# Patient Record
Sex: MALE | Race: WHITE | NOT HISPANIC OR LATINO | Employment: FULL TIME | URBAN - METROPOLITAN AREA
[De-identification: names, ages, dates, MRNs, and addresses within clinical notes are randomized per-mention and may not be internally consistent; named-entity substitution may affect disease eponyms.]

---
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
## 2020-03-06 ENCOUNTER — OFFICE VISIT (OUTPATIENT)
Dept: FAMILY MEDICINE CLINIC | Facility: CLINIC | Age: 20
End: 2020-03-06
Payer: COMMERCIAL

## 2020-03-06 VITALS
DIASTOLIC BLOOD PRESSURE: 70 MMHG | WEIGHT: 130 LBS | BODY MASS INDEX: 18.61 KG/M2 | SYSTOLIC BLOOD PRESSURE: 102 MMHG | TEMPERATURE: 97.5 F | HEIGHT: 70 IN

## 2020-03-06 DIAGNOSIS — B35.1 ONYCHOMYCOSIS: Primary | ICD-10-CM

## 2020-03-06 DIAGNOSIS — G43.009 MIGRAINE WITHOUT AURA AND WITHOUT STATUS MIGRAINOSUS, NOT INTRACTABLE: ICD-10-CM

## 2020-03-06 PROCEDURE — 1036F TOBACCO NON-USER: CPT | Performed by: PHYSICIAN ASSISTANT

## 2020-03-06 PROCEDURE — 3008F BODY MASS INDEX DOCD: CPT | Performed by: PHYSICIAN ASSISTANT

## 2020-03-06 PROCEDURE — 99213 OFFICE O/P EST LOW 20 MIN: CPT | Performed by: PHYSICIAN ASSISTANT

## 2020-03-06 RX ORDER — TERBINAFINE HYDROCHLORIDE 250 MG/1
TABLET ORAL
Qty: 28 TABLET | Refills: 0 | Status: SHIPPED | OUTPATIENT
Start: 2020-03-06 | End: 2020-07-07

## 2020-03-06 NOTE — PROGRESS NOTES
Assessment and Plan:  Patient Instructions     1  Onycomycosis-recommend Lamisil pulse therapy  Patient to take 1 tablet daily for 1 week, then off for 3 weeks and repeat for 4 cycles  Possible side effects of medication were discussed with patient  2   Migraine headaches -these have been improved  No medication changes at this time  Problem List Items Addressed This Visit        Cardiovascular and Mediastinum    Migraine without aura and without status migrainosus, not intractable      Other Visit Diagnoses     Onychomycosis    -  Primary    Relevant Medications    terbinafine (LamISIL) 250 mg tablet                 Diagnoses and all orders for this visit:    Onychomycosis  -     terbinafine (LamISIL) 250 mg tablet; Take 1 tablet daily by mouth for 1 week at of each month for 4 cycles  Migraine without aura and without status migrainosus, not intractable              Subjective:      Patient ID: Cleatus Severance is a 23 y o  male  CC:    Chief Complaint   Patient presents with    Nail Problem     Pt states he is here for toe nail issues, he states they are  yellow and states he is unaware of when this began  HPI:      HPI:  This is a 44-year-old gentleman that presents to the office with concerns over yellowing and thickening of the toenails  He is not exactly sure when this started but it seems to involve multiple toenails at this point  He is interested in going to the beach this summer and would like to try to clear this infection  The following portions of the patient's history were reviewed and updated as appropriate: allergies, current medications, past family history, past medical history, past social history, past surgical history and problem list       Review of Systems   Constitutional: Negative for chills, fatigue and fever  HENT: Negative for congestion, ear pain and sinus pressure  Eyes: Negative for visual disturbance     Respiratory: Negative for cough, chest tightness and shortness of breath  Cardiovascular: Negative for chest pain and palpitations  Gastrointestinal: Negative for diarrhea, nausea and vomiting  Endocrine: Negative for polyuria  Genitourinary: Negative for dysuria and frequency  Musculoskeletal: Negative for arthralgias and myalgias  Skin: Negative for pallor and rash  Yellowing and thickening of the toenails of both feet  Neurological: Negative for dizziness, weakness, light-headedness, numbness and headaches  Psychiatric/Behavioral: Negative for agitation, behavioral problems and sleep disturbance  All other systems reviewed and are negative  Data to review:       Objective:    Vitals:    03/06/20 1129   BP: 102/70   BP Location: Left arm   Patient Position: Sitting   Cuff Size: Adult   Temp: 97 5 °F (36 4 °C)   TempSrc: Tympanic   Weight: 59 kg (130 lb)   Height: 5' 10" (1 778 m)        Physical Exam   Constitutional: He is oriented to person, place, and time  He appears well-developed and well-nourished  No distress  HENT:   Head: Normocephalic and atraumatic  Right Ear: External ear normal    Left Ear: External ear normal    Nose: Nose normal    Mouth/Throat: Oropharynx is clear and moist  No oropharyngeal exudate  Eyes: Pupils are equal, round, and reactive to light  Conjunctivae and EOM are normal    Neck: Normal range of motion  Neck supple  No tracheal deviation present  No thyromegaly present  Cardiovascular: Normal rate, regular rhythm and normal heart sounds  Exam reveals no friction rub  No murmur heard  Pulmonary/Chest: Effort normal and breath sounds normal  No respiratory distress  He has no wheezes  He has no rales  Abdominal: Soft  Bowel sounds are normal  He exhibits no distension  There is no tenderness  There is no rebound and no guarding  Musculoskeletal: Normal range of motion  He exhibits no edema or tenderness  Lymphadenopathy:     He has no cervical adenopathy     Neurological: He is alert and oriented to person, place, and time  No cranial nerve deficit  Coordination normal    Skin: Skin is warm and dry  No rash noted  No erythema  Toenails of the 2nd through 5th digits of both feet are yellow and hypertrophic  Psychiatric: He has a normal mood and affect  His behavior is normal  Thought content normal    Nursing note and vitals reviewed

## 2020-03-06 NOTE — PATIENT INSTRUCTIONS
1   Onycomycosis-recommend Lamisil pulse therapy  Patient to take 1 tablet daily for 1 week, then off for 3 weeks and repeat for 4 cycles  Possible side effects of medication were discussed with patient  2   Migraine headaches -these have been improved  No medication changes at this time

## 2020-03-19 ENCOUNTER — TELEPHONE (OUTPATIENT)
Dept: FAMILY MEDICINE CLINIC | Facility: CLINIC | Age: 20
End: 2020-03-19

## 2020-03-19 NOTE — TELEPHONE ENCOUNTER
PATIENT CALLED PRESENTING WITH COUGH, ABD PAIN, FATIGUE AND HEADACHE-NEG FEVER, NEG SOB, NO OTHER SYMPTOMS, NEG TRAVEL AND DENIES ANY KNOWN EXPOSURE TO ILL OR SUSPECTED COVID PATIENTS   PER DR MARROQUIN PT WAS SCHEDULED WITH VIRTUAL VISIT WITH NELLA AKINS ON 03/20/2020

## 2020-03-20 ENCOUNTER — TELEMEDICINE (OUTPATIENT)
Dept: FAMILY MEDICINE CLINIC | Facility: CLINIC | Age: 20
End: 2020-03-20
Payer: COMMERCIAL

## 2020-03-20 DIAGNOSIS — J01.00 ACUTE NON-RECURRENT MAXILLARY SINUSITIS: Primary | ICD-10-CM

## 2020-03-20 PROCEDURE — 99213 OFFICE O/P EST LOW 20 MIN: CPT | Performed by: PHYSICIAN ASSISTANT

## 2020-03-20 RX ORDER — AZITHROMYCIN 250 MG/1
TABLET, FILM COATED ORAL
Qty: 6 TABLET | Refills: 0 | Status: SHIPPED | OUTPATIENT
Start: 2020-03-20 | End: 2020-03-25

## 2020-03-20 NOTE — PATIENT INSTRUCTIONS
1   Acute sinusitis-patient does have some slightly improving symptoms today  Advised him that this could still be viral in he could give it a day or 2 to see if symptoms improve  I will however send in a prescription antibiotic is Zithromax Z-Toño to be used if symptoms are worsening or persistent over the course of the weekend  If he is having more mucus production with his cough or thicker mucus from the nose this would be recommended  Patient verbalizes understanding and agreement with plan  In the meantime he will continue ibuprofen, fluids, and Delsym as needed for cough  A work note will be mailed to his home address for this past Wednesday and Thursday

## 2020-11-17 ENCOUNTER — TELEMEDICINE (OUTPATIENT)
Dept: FAMILY MEDICINE CLINIC | Facility: CLINIC | Age: 20
End: 2020-11-17
Payer: COMMERCIAL

## 2020-11-17 DIAGNOSIS — Z20.822 EXPOSURE TO COVID-19 VIRUS: Primary | ICD-10-CM

## 2020-11-17 PROCEDURE — 99212 OFFICE O/P EST SF 10 MIN: CPT | Performed by: FAMILY MEDICINE

## 2020-11-17 PROCEDURE — 1036F TOBACCO NON-USER: CPT | Performed by: FAMILY MEDICINE

## 2021-01-07 NOTE — PROGRESS NOTES
Assessment and Plan:  Patient Instructions   1  Headaches-these sound most likely chronic daily tension headaches  I would recommend assessing full labs including thyroid, Lyme titer, sed rate, CBC, CMP  If all labs are normal consider further imaging with MRI of the brain  Patient was also given prescription for diclofenac 75 mg to be used as necessary at the onset of headache  Vision exam was within normal, 20/20 bilaterally in the office  Diagnoses and all orders for this visit:    Nonintractable headache, unspecified chronicity pattern, unspecified headache type  -     CBC and differential  -     Comprehensive metabolic panel  -     TSH, 3rd generation with Free T4 reflex  -     Lyme Antibody Profile with reflex to WB  -     Sedimentation rate, automated  -     diclofenac (VOLTAREN) 75 mg EC tablet; Take 1 tablet at onset of headache as necessary  Subjective:      Patient ID: Camilo Jimenez is a 25 y o  male  CC:    Chief Complaint   Patient presents with    Headache     daily for a long time  Has tried Excedrin migraine with no relief  mjs       HPI:    HPI:  This is an 25year-old gentleman that presents to the office with concerns over headaches that have been becoming more frequent over the past few months  He is now having a headache almost every day  Headaches are usually bilateral at the temporal area  They often come later in the day  At times he can rate them a 9/10 on the pain scale and they can be incapacitating of what he is doing at that time  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  He has not had any major changes in his dietary intake and has not been drinking as much caffeine recently  Typically when he takes Excedrin migraine it does help a little bit with the symptoms  He has not had any nausea or vomiting with the symptoms  He has not had any photophobia or phonophobia        The following portions of the patient's history were reviewed and updated as appropriate: allergies, current medications, past family history, past medical history, past social history, past surgical history and problem list       Review of Systems   Constitutional: Negative for chills, fatigue and fever  HENT: Negative for congestion, ear pain and sinus pressure  Eyes: Negative for visual disturbance  Respiratory: Negative for cough, chest tightness and shortness of breath  Cardiovascular: Negative for chest pain and palpitations  Gastrointestinal: Negative for diarrhea, nausea and vomiting  Endocrine: Negative for polyuria  Genitourinary: Negative for dysuria and frequency  Musculoskeletal: Negative for arthralgias and myalgias  Skin: Negative for pallor and rash  Neurological: Positive for headaches  Negative for dizziness, weakness, light-headedness and numbness  Psychiatric/Behavioral: Negative for agitation, behavioral problems and sleep disturbance  All other systems reviewed and are negative  Data to review:       Objective:    Vitals:    03/21/19 1425   BP: 94/62   Pulse: 80   Weight: 59 9 kg (132 lb)   Height: 5' 9 5" (1 765 m)        Physical Exam   Constitutional: He is oriented to person, place, and time  He appears well-developed and well-nourished  No distress  HENT:   Head: Normocephalic and atraumatic  Right Ear: External ear normal    Left Ear: External ear normal    Nose: Nose normal    Mouth/Throat: Oropharynx is clear and moist  No oropharyngeal exudate  Eyes: Pupils are equal, round, and reactive to light  Conjunctivae and EOM are normal    Neck: Normal range of motion  Neck supple  No tracheal deviation present  No thyromegaly present  Cardiovascular: Normal rate, regular rhythm and normal heart sounds  Exam reveals no friction rub  No murmur heard  Pulmonary/Chest: Effort normal and breath sounds normal  No respiratory distress  He has no wheezes  He has no rales     Abdominal: Soft  Bowel sounds are normal  He exhibits no distension  There is no tenderness  There is no rebound and no guarding  Musculoskeletal: Normal range of motion  He exhibits no edema or tenderness  Lymphadenopathy:     He has no cervical adenopathy  Neurological: He is alert and oriented to person, place, and time  No cranial nerve deficit  Coordination normal    Skin: Skin is warm and dry  No rash noted  No erythema  Psychiatric: He has a normal mood and affect  His behavior is normal  Thought content normal    Nursing note and vitals reviewed  negative detailed exam

## 2021-01-11 ENCOUNTER — TELEMEDICINE (OUTPATIENT)
Dept: FAMILY MEDICINE CLINIC | Facility: CLINIC | Age: 21
End: 2021-01-11
Payer: COMMERCIAL

## 2021-01-11 VITALS — WEIGHT: 140 LBS | TEMPERATURE: 98.1 F | BODY MASS INDEX: 20.09 KG/M2

## 2021-01-11 DIAGNOSIS — G43.009 MIGRAINE WITHOUT AURA AND WITHOUT STATUS MIGRAINOSUS, NOT INTRACTABLE: Primary | ICD-10-CM

## 2021-01-11 PROCEDURE — 1036F TOBACCO NON-USER: CPT | Performed by: NURSE PRACTITIONER

## 2021-01-11 PROCEDURE — 99213 OFFICE O/P EST LOW 20 MIN: CPT | Performed by: NURSE PRACTITIONER

## 2021-01-11 RX ORDER — PREDNISONE 20 MG/1
40 TABLET ORAL DAILY
Qty: 10 TABLET | Refills: 0 | Status: SHIPPED | OUTPATIENT
Start: 2021-01-11 | End: 2021-01-16

## 2021-01-11 RX ORDER — TOPIRAMATE 25 MG/1
25 TABLET ORAL 2 TIMES DAILY
Qty: 60 TABLET | Refills: 0 | Status: SHIPPED | OUTPATIENT
Start: 2021-01-11 | End: 2021-06-03

## 2021-01-11 NOTE — PATIENT INSTRUCTIONS
Problem List Items Addressed This Visit        Cardiovascular and Mediastinum    Migraine without aura and without status migrainosus, not intractable - Primary     With no neurological involvement I have low suspicion that this is an acute event such as a brain bleed  Will begin Topamax b i d  As a preventative measure for migraines  Will also begin a 5 day course of prednisone to help with inflammation and pain  Patient was told to follow-up in 1 week if symptoms do not improve  Relevant Medications    topiramate (TOPAMAX) 25 mg tablet    predniSONE 20 mg tablet        COVID-19 Home Care Guidelines    Your healthcare provider and/or public health staff have evaluated you and have determined that you do not need to remain in the hospital at this time  At this time you can be isolated at home where you will be monitored by staff from your local or state health department  You should carefully follow the prevention and isolation steps below until a healthcare provider or local or state health department says that you can return to your normal activities  Stay home except to get medical care    People who are mildly ill with COVID-19 are able to isolate at home during their illness  You should restrict activities outside your home, except for getting medical care  Do not go to work, school, or public areas  Avoid using public transportation, ride-sharing, or taxis  Separate yourself from other people and animals in your home    People: As much as possible, you should stay in a specific room and away from other people in your home  Also, you should use a separate bathroom, if available  Animals: You should restrict contact with pets and other animals while you are sick with COVID-19, just like you would around other people   Although there have not been reports of pets or other animals becoming sick with COVID-19, it is still recommended that people sick with COVID-19 limit contact with animals until more information is known about the virus  When possible, have another member of your household care for your animals while you are sick  If you are sick with COVID-19, avoid contact with your pet, including petting, snuggling, being kissed or licked, and sharing food  If you must care for your pet or be around animals while you are sick, wash your hands before and after you interact with pets and wear a facemask  See COVID-19 and Animals for more information  Call ahead before visiting your doctor    If you have a medical appointment, call the healthcare provider and tell them that you have or may have COVID-19  This will help the healthcare providers office take steps to keep other people from getting infected or exposed  Wear a facemask    You should wear a facemask when you are around other people (e g , sharing a room or vehicle) or pets and before you enter a healthcare providers office  If you are not able to wear a facemask (for example, because it causes trouble breathing), then people who live with you should not stay in the same room with you, or they should wear a facemask if they enter your room  Cover your coughs and sneezes    Cover your mouth and nose with a tissue when you cough or sneeze  Throw used tissues in a lined trash can  Immediately wash your hands with soap and water for at least 20 seconds or, if soap and water are not available, clean your hands with an alcohol-based hand  that contains at least 60% alcohol  Clean your hands often    Wash your hands often with soap and water for at least 20 seconds, especially after blowing your nose, coughing, or sneezing; going to the bathroom; and before eating or preparing food  If soap and water are not readily available, use an alcohol-based hand  with at least 60% alcohol, covering all surfaces of your hands and rubbing them together until they feel dry    Soap and water are the best option if hands are visibly dirty  Avoid touching your eyes, nose, and mouth with unwashed hands  Avoid sharing personal household items    You should not share dishes, drinking glasses, cups, eating utensils, towels, or bedding with other people or pets in your home  After using these items, they should be washed thoroughly with soap and water  Clean all high-touch surfaces everyday    High touch surfaces include counters, tabletops, doorknobs, bathroom fixtures, toilets, phones, keyboards, tablets, and bedside tables  Also, clean any surfaces that may have blood, stool, or body fluids on them  Use a household cleaning spray or wipe, according to the label instructions  Labels contain instructions for safe and effective use of the cleaning product including precautions you should take when applying the product, such as wearing gloves and making sure you have good ventilation during use of the product  Monitor your symptoms    Seek prompt medical attention if your illness is worsening (e g , difficulty breathing)  Before seeking care, call your healthcare provider and tell them that you have, or are being evaluated for, COVID-19  Put on a facemask before you enter the facility  These steps will help the healthcare providers office to keep other people in the office or waiting room from getting infected or exposed  Ask your healthcare provider to call the local or state health department  Persons who are placed under active monitoring or facilitated self-monitoring should follow instructions provided by their local health department or occupational health professionals, as appropriate  If you have a medical emergency and need to call 911, notify the dispatch personnel that you have, or are being evaluated for COVID-19  If possible, put on a facemask before emergency medical services arrive      Discontinuing home isolation    Patients with confirmed COVID-19 should remain under home isolation precautions until the following conditions are met:   - They have had no fever for at least 24 hours (that is one full day of no fever without the use medicine that reduces fevers)  AND  - other symptoms have improved (for example, when their cough or shortness of breath have improved)  AND  - If had mild or moderate illness, at least 10 days have passed since their symptoms first appeared or if severe illness (needed oxygen) or immunosuppressed, at least 20 days have passed since symptoms first appeared  Patients with confirmed COVID-19 should also notify close contacts (including their workplace) and ask that they self-quarantine  Currently, close contact is defined as being within 6 feet for 15 minutes or more from the period 24 hours starting 48 hours before symptom onset to the time at which the patient went into isolation  Close contacts of patients diagnosed with COVID-19 should be instructed by the patient to self-quarantine for 14 days from the last time of their last contact with the patient       Source: [employer]

## 2021-01-11 NOTE — PROGRESS NOTES
Virtual Regular Visit      Assessment/Plan:    Problem List Items Addressed This Visit        Cardiovascular and Mediastinum    Migraine without aura and without status migrainosus, not intractable - Primary     With no neurological involvement I have low suspicion that this is an acute event such as a brain bleed  Will begin Topamax b i d  As a preventative measure for migraines  Will also begin a 5 day course of prednisone to help with inflammation and pain  Patient was told to follow-up in 1 week if symptoms do not improve  Relevant Medications    topiramate (TOPAMAX) 25 mg tablet    predniSONE 20 mg tablet               Reason for visit is   Chief Complaint   Patient presents with    Headache     Pt c/o ongoing headaches  Pt has a known migraines but states this is different  He states he has pain in the back of his head and stiffness in neck  He has been using Ibuprofen with no relief  kw    Virtual Regular Visit        Encounter provider ISRA Serna    Provider located at [address] [place] [address] PRIMARY CARE  [facility] [id number]      Recent Visits  No visits were found meeting these conditions  Showing recent visits within past 7 days and meeting all other requirements     Today's Visits  Date Type Provider Dept   01/11/21 Telemedicine [facility] Primary Care   Showing today's visits and meeting all other requirements     Future Appointments  No visits were found meeting these conditions  Showing future appointments within next 150 days and meeting all other requirements        The patient was identified by name and date of birth  Casi De Souza was informed that this is a telemedicine visit and that the visit is being conducted through [employer] and patient was informed that this is not a secure, HIPAA-compliant platform  He agrees to proceed     My office door was closed  No one else was in the room    He acknowledged consent and understanding of privacy and security of the video platform  The patient has agreed to participate and understands they can discontinue the visit at any time  Patient is aware this is a billable service  Subjective  Luis Angel Smith is a 21 y o  male    Patient has bee having ongoing headaches  He states he has pain in the back of his head and stiffness in his neck  He has been having the headaches for about a week every other day  He reports that the pain is dull but it is there constantly  He does have a history of migraines in the past  He has been taking Ibuprofen with no relief  He states that 2 days ago he began to feel very nauseated but it passed after about an hour  He states he currently has a headache today  He states he can feel his pulse on the back of his head but denies that his head feels like it is throbbing  Patient was unable to check his BP during exam  Denies any neurological symptoms such as weakness, changes in his vision or hearing, loss of sensation, lightheadedness or dizziness  Past Medical History:   Diagnosis Date    Allergic rhinitis     Resolved 2/27/2017     Displaced fracture of proximal phalanx of unspecified thumb, initial encounter for closed fracture     Resolved 2/27/2017     Head injury     Resolved 2/27/2017     Migraines        Past Surgical History:   Procedure Laterality Date    NO PAST SURGERIES         Current Outpatient Medications   Medication Sig Dispense Refill    predniSONE 20 mg tablet Take 2 tablets (40 mg total) by mouth daily for 5 days 10 tablet 0    topiramate (TOPAMAX) 25 mg tablet Take 1 tablet (25 mg total) by mouth 2 (two) times a day 60 tablet 0     No current facility-administered medications for this visit           Allergies   Allergen Reactions    Cefprozil Hives     Got rash after taking for several days      Penicillins Hives     Got bad hives right away         Review of Systems   Constitutional: Negative for chills, fatigue and fever  HENT: Negative for ear pain, sinus pressure, sinus pain and sore throat  Eyes: Negative for pain and visual disturbance  Respiratory: Negative for cough and shortness of breath  Cardiovascular: Negative for chest pain and palpitations  Gastrointestinal: Negative for abdominal pain and vomiting  Endocrine: Negative for polydipsia, polyphagia and polyuria  Genitourinary: Negative for dysuria and hematuria  Musculoskeletal: Negative for arthralgias and back pain  Skin: Negative for color change and rash  Neurological: Positive for headaches (occipital-across the back of his head )  Negative for dizziness, tremors, seizures, syncope, weakness and light-headedness  Psychiatric/Behavioral: Negative for confusion, decreased concentration and hallucinations  The patient is not nervous/anxious and is not hyperactive  All other systems reviewed and are negative  Video Exam    Vitals:    01/11/21 1525   Temp: 98 1 °F (36 7 °C)   TempSrc: Temporal   Weight: 63 5 kg (140 lb)       Physical Exam  Vitals reviewed: limited due to [employer] exam    Constitutional:       General: He is not in acute distress  Appearance: Normal appearance  He is not ill-appearing  Neurological:      Mental Status: He is alert  I spent 15 minutes directly with the patient during this visit      [address] acknowledges that he has consented to an online visit or consultation  He understands that the online visit is based solely on information provided by him, and that, in the absence of a face-to-face physical evaluation by the physician, the diagnosis he receives is both limited and provisional in terms of accuracy and completeness  This is not intended to replace a full medical face-to-face evaluation by the physician  Mat Mercedes understands and accepts these terms

## 2021-01-11 NOTE — ASSESSMENT & PLAN NOTE
With no neurological involvement I have low suspicion that this is an acute event such as a brain bleed  Will begin Topamax b i d  As a preventative measure for migraines  Will also begin a 5 day course of prednisone to help with inflammation and pain  Patient was told to follow-up in 1 week if symptoms do not improve

## 2021-03-12 ENCOUNTER — TELEMEDICINE (OUTPATIENT)
Dept: FAMILY MEDICINE CLINIC | Facility: CLINIC | Age: 21
End: 2021-03-12
Payer: COMMERCIAL

## 2021-03-12 VITALS — TEMPERATURE: 97.8 F | WEIGHT: 140 LBS | HEIGHT: 71 IN | BODY MASS INDEX: 19.6 KG/M2

## 2021-03-12 DIAGNOSIS — H92.09 OTALGIA, UNSPECIFIED LATERALITY: ICD-10-CM

## 2021-03-12 DIAGNOSIS — R51.9 ACUTE NONINTRACTABLE HEADACHE, UNSPECIFIED HEADACHE TYPE: Primary | ICD-10-CM

## 2021-03-12 DIAGNOSIS — G43.009 MIGRAINE WITHOUT AURA AND WITHOUT STATUS MIGRAINOSUS, NOT INTRACTABLE: ICD-10-CM

## 2021-03-12 PROCEDURE — 3725F SCREEN DEPRESSION PERFORMED: CPT | Performed by: FAMILY MEDICINE

## 2021-03-12 PROCEDURE — 3008F BODY MASS INDEX DOCD: CPT | Performed by: FAMILY MEDICINE

## 2021-03-12 PROCEDURE — 1036F TOBACCO NON-USER: CPT | Performed by: FAMILY MEDICINE

## 2021-03-12 PROCEDURE — 99214 OFFICE O/P EST MOD 30 MIN: CPT | Performed by: FAMILY MEDICINE

## 2021-03-12 RX ORDER — METHYLPREDNISOLONE 4 MG/1
TABLET ORAL
Qty: 1 EACH | Refills: 0 | Status: SHIPPED | OUTPATIENT
Start: 2021-03-12 | End: 2021-03-18

## 2021-03-12 NOTE — PROGRESS NOTES
COVID-19 Virtual Visit     Assessment/Plan:    1  Headache, mild intermittent since Monday  Not worst of life  2  Migraine headache by history  3  Mild ear pain   Patient will be sent for COVID testing when quarantine at home to result is known  Medrol Dosepak is ordered  Return in 1 week if still symptoms  If symptoms worsen over we can report to [facility] Emergency Department      Problem List Items Addressed This Visit        Cardiovascular and Mediastinum    Migraine without aura and without status migrainosus, not intractable      Medrol prescribed         Relevant Medications    methylPREDNISolone 4 MG tablet therapy pack      Other Visit Diagnoses     Acute nonintractable headache, unspecified headache type    -  Primary    Relevant Medications    methylPREDNISolone 4 MG tablet therapy pack    Other Relevant Orders    Novel Coronavirus (Covid-19),PCR [facility] - Collected at [facility]    Otalgia, unspecified laterality        Relevant Medications    methylPREDNISolone 4 MG tablet therapy pack    Other Relevant Orders    Novel Coronavirus (Covid-19),PCR [facility] - Collected at [facility]         Disposition:     I referred patient to one of our centralized sites for a COVID-19 swab  Patient was sent for COVID testing  Medrol Dosepak was prescribed    I have spent 17 minutes directly with the patient  Encounter provider Nay Soriano DO    Provider located at [address] [place] [address] PRIMARY CARE  [facility] [id number]    Recent Visits  No visits were found meeting these conditions  Showing recent visits within past 7 days and meeting all other requirements     Today's Visits  Date Type Provider Dept   03/12/21 Telemedicine Nay Soriano DO [facility]   Showing today's visits and meeting all other requirements     Future Appointments  No visits were found meeting these conditions     Showing future appointments within next 150 days and meeting all other requirements      This virtual check-in was done via [employer] and patient was informed that this is not a secure, HIPAA-compliant platform  He agrees to proceed  Patient agrees to participate in a virtual check in via telephone or video visit instead of presenting to the office to address urgent/immediate medical needs  Patient is aware this is a billable service  After connecting through [facility], the patient was identified by name and date of birth  Polo Moore was informed that this was a telemedicine visit and that the exam was being conducted confidentially over secure lines  My office door was closed  No one else was in the room  Polo Moore acknowledged consent and understanding of privacy and security of the telemedicine visit  I informed the patient that I have reviewed his record in Epic and presented the opportunity for him to ask any questions regarding the visit today  The patient agreed to participate  Subjective:   Polo Moore is a 21 y o  male who is concerned about COVID-19  Patient's symptoms include headache  Patient denies fever, chills, fatigue, malaise, congestion, rhinorrhea, sore throat, anosmia, loss of taste, cough, shortness of breath, chest tightness, abdominal pain, nausea, vomiting, diarrhea and myalgias       Date of symptom onset: 3/8/2021    Exposure:   Contact with a person who is under investigation (PUI) for or who is positive for COVID-19 within the last 14 days?: No    Hospitalized recently for fever and/or lower respiratory symptoms?: No      Currently a healthcare worker that is involved in direct patient care?: No      Works in a special setting where the risk of COVID-19 transmission may be high? (this may include long-term care, correctional and group home facilities; homeless shelters; assisted-living facilities and group homes ): No      Resident in a special setting where the risk of COVID-19 transmission may be high? (this may include long-term care, correctional and snf facilities; homeless shelters; assisted-living facilities and group homes ): No       On Monday patient started with headache mainly right final off and on this week  Not the most severe  Patient does have a history of migraine  Patient on Wednesday and some mild ear pain , none today  Patient has not taken medication for this    Lab Results   Component Value Date    SARSCORONAVI NOT DETECTED 11/16/2020     Past Medical History:   Diagnosis Date    Allergic rhinitis     Resolved 2/27/2017     Displaced fracture of proximal phalanx of unspecified thumb, initial encounter for closed fracture     Resolved 2/27/2017     Head injury     Resolved 2/27/2017     Migraines      Past Surgical History:   Procedure Laterality Date    NO PAST SURGERIES       Current Outpatient Medications   Medication Sig Dispense Refill    methylPREDNISolone 4 MG tablet therapy pack Use as directed on package 1 each 0    topiramate (TOPAMAX) 25 mg tablet Take 1 tablet (25 mg total) by mouth 2 (two) times a day (Patient not taking: Reported on 3/12/2021) 60 tablet 0     No current facility-administered medications for this visit  Allergies   Allergen Reactions    Cefprozil Hives     Got rash after taking for several days      Penicillins Hives     Got bad hives right away         Review of Systems   Constitutional: Negative for chills, fatigue and fever  HENT: Negative for congestion, rhinorrhea and sore throat  Respiratory: Negative for cough, chest tightness and shortness of breath  Gastrointestinal: Negative for abdominal pain, diarrhea, nausea and vomiting  Musculoskeletal: Negative for myalgias  Neurological: Positive for headaches  Objective:    Vitals:    03/12/21 1016   Temp: 97 8 °F (36 6 °C)   Weight: 63 5 kg (140 lb)   Height: 5' 11" (1 803 m)       Physical Exam  Vitals signs and nursing note reviewed  Constitutional:       General: He is not in acute distress  Appearance: Normal appearance  He is normal weight  He is not ill-appearing, toxic-appearing or diaphoretic  HENT:      Head: Normocephalic and atraumatic  Mouth/Throat:      Mouth: Mucous membranes are moist    Eyes:      General: No scleral icterus  Right eye: No discharge  Left eye: No discharge  Extraocular Movements: Extraocular movements intact  Conjunctiva/sclera: Conjunctivae normal    Neck:      Musculoskeletal: No neck rigidity  Pulmonary:      Effort: Pulmonary effort is normal    Skin:     General: Skin is dry  Neurological:      Mental Status: He is alert and oriented to person, place, and time  Cranial Nerves: No cranial nerve deficit  Psychiatric:         Mood and Affect: Mood normal          Behavior: Behavior normal          Thought Content: Thought content normal          Judgment: Judgment normal        VIRTUAL VISIT DISCLAIMER    Alexusroverto Winter acknowledges that he has consented to an online visit or consultation  He understands that the online visit is based solely on information provided by him, and that, in the absence of a face-to-face physical evaluation by the physician, the diagnosis he receives is both limited and provisional in terms of accuracy and completeness  This is not intended to replace a full medical face-to-face evaluation by the physician  Aneesh Angeles understands and accepts these terms

## 2021-06-03 ENCOUNTER — OFFICE VISIT (OUTPATIENT)
Dept: FAMILY MEDICINE CLINIC | Facility: CLINIC | Age: 21
End: 2021-06-03
Payer: COMMERCIAL

## 2021-06-03 VITALS
HEART RATE: 80 BPM | WEIGHT: 135 LBS | SYSTOLIC BLOOD PRESSURE: 106 MMHG | DIASTOLIC BLOOD PRESSURE: 78 MMHG | HEIGHT: 71 IN | TEMPERATURE: 98.2 F | BODY MASS INDEX: 18.9 KG/M2

## 2021-06-03 DIAGNOSIS — G43.009 MIGRAINE WITHOUT AURA AND WITHOUT STATUS MIGRAINOSUS, NOT INTRACTABLE: ICD-10-CM

## 2021-06-03 DIAGNOSIS — H43.392 VITREOUS FLOATERS OF LEFT EYE: ICD-10-CM

## 2021-06-03 DIAGNOSIS — H92.09 OTALGIA, UNSPECIFIED LATERALITY: Primary | ICD-10-CM

## 2021-06-03 PROCEDURE — 1036F TOBACCO NON-USER: CPT | Performed by: PHYSICIAN ASSISTANT

## 2021-06-03 PROCEDURE — 3008F BODY MASS INDEX DOCD: CPT | Performed by: PHYSICIAN ASSISTANT

## 2021-06-03 PROCEDURE — 99214 OFFICE O/P EST MOD 30 MIN: CPT | Performed by: PHYSICIAN ASSISTANT

## 2021-06-03 RX ORDER — SULFAMETHOXAZOLE AND TRIMETHOPRIM 800; 160 MG/1; MG/1
1 TABLET ORAL EVERY 12 HOURS SCHEDULED
Qty: 20 TABLET | Refills: 0 | Status: SHIPPED | OUTPATIENT
Start: 2021-06-03 | End: 2021-06-13

## 2021-06-03 NOTE — PATIENT INSTRUCTIONS
Assessment/plan:  1  Otalgia-left ear-appears tympanic membrane is clear but somewhat retracted  Would recommend treating for infection as well as eustachian tube dysfunction  He will be started on Bactrim DS, 1 tablet twice daily for 10 days as well as Flonase nasal spray, 2 sprays in each nostril twice daily  Recommend follow-up in the next week if symptoms are not improving  2  Floaters of the left eye-recommend evaluation by Ophthalmology  Order placed  3  Migraine headache-presently stable, no medication changes

## 2021-06-03 NOTE — PROGRESS NOTES
Assessment and Plan:  Patient Instructions   Assessment/plan:  1  Otalgia-left ear-appears tympanic membrane is clear but somewhat retracted  Would recommend treating for infection as well as eustachian tube dysfunction  He will be started on Bactrim DS, 1 tablet twice daily for 10 days as well as Flonase nasal spray, 2 sprays in each nostril twice daily  Recommend follow-up in the next week if symptoms are not improving  2  Floaters of the left eye-recommend evaluation by Ophthalmology  Order placed  3  Migraine headache-presently stable, no medication changes  Problem List Items Addressed This Visit        Cardiovascular and Mediastinum    Migraine without aura and without status migrainosus, not intractable      Other Visit Diagnoses     Otalgia, unspecified laterality    -  Primary    Relevant Medications    sulfamethoxazole-trimethoprim (BACTRIM DS) 800-160 mg per tablet    Vitreous floaters of left eye        Relevant Orders    Ambulatory referral to Ophthalmology                 Diagnoses and all orders for this visit:    Otalgia, unspecified laterality  -     sulfamethoxazole-trimethoprim (BACTRIM DS) 800-160 mg per tablet; Take 1 tablet by mouth every 12 (twelve) hours for 10 days    Vitreous floaters of left eye  -     Ambulatory referral to Ophthalmology; Future    Migraine without aura and without status migrainosus, not intractable              Subjective:      Patient ID: Jason Dias is a 24 y o  male  CC:    Chief Complaint   Patient presents with    Earache     c/o B/L earache  Left worse than right  Taking Tylenol  Onset 2 weeks  mjs       HPI:    HPI:  This is a 80-year-old gentleman that presents to the office with concerns over ear pain which has been going on for about 2 weeks  He does notice the symptoms are greater in the left side rather than the right  Patient states that he has had some crackling and popping in the ear    He has not noticed any difficulty hearing from the ear however  He does not feel that symptoms have gotten significantly better in the past 2 weeks  He also has a history of migraine headaches which have been stable  He does notice that he has had some floaters in his left eye for several months now  The seem to come and go more randomly  He does not seen eye doctor on a regular basis  The following portions of the patient's history were reviewed and updated as appropriate: allergies, current medications, past family history, past medical history, past social history, past surgical history and problem list       Review of Systems   Constitutional: Negative for chills, fatigue and fever  HENT: Positive for ear pain  Negative for congestion and sinus pressure  Eyes: Positive for visual disturbance  Floaters of the left eye   Respiratory: Negative for cough, chest tightness and shortness of breath  Cardiovascular: Negative for chest pain and palpitations  Gastrointestinal: Negative for diarrhea, nausea and vomiting  Endocrine: Negative for polyuria  Genitourinary: Negative for dysuria and frequency  Musculoskeletal: Negative for arthralgias and myalgias  Skin: Negative for pallor and rash  Neurological: Negative for dizziness, weakness, light-headedness, numbness and headaches  Psychiatric/Behavioral: Negative for agitation, behavioral problems and sleep disturbance  All other systems reviewed and are negative  Data to review:       Objective:    Vitals:    06/03/21 1520   BP: 106/78   Pulse: 80   Temp: 98 2 °F (36 8 °C)   Weight: 61 2 kg (135 lb)   Height: 5' 11" (1 803 m)        Physical Exam  Constitutional:       General: He is not in acute distress  Appearance: He is well-developed  HENT:      Head: Normocephalic and atraumatic  Right Ear: Tympanic membrane normal       Ears:      Comments: Left tympanic membrane is dull, retracted  There is no erythema or purulence however    Eyes: Conjunctiva/sclera: Conjunctivae normal       Pupils: Pupils are equal, round, and reactive to light  Comments: Red reflex bilaterally  Neck:      Musculoskeletal: Normal range of motion  Cardiovascular:      Rate and Rhythm: Normal rate and regular rhythm  Pulmonary:      Effort: Pulmonary effort is normal    Abdominal:      General: Abdomen is flat  Bowel sounds are normal  There is no distension  Palpations: Abdomen is soft  There is no mass  Musculoskeletal: Normal range of motion  Skin:     General: Skin is warm  Findings: No rash  Neurological:      Mental Status: He is alert and oriented to person, place, and time     Psychiatric:         Mood and Affect: Mood normal

## 2021-06-08 ENCOUNTER — TELEPHONE (OUTPATIENT)
Dept: FAMILY MEDICINE CLINIC | Facility: CLINIC | Age: 21
End: 2021-06-08

## 2022-01-03 ENCOUNTER — OFFICE VISIT (OUTPATIENT)
Dept: URGENT CARE | Facility: CLINIC | Age: 22
End: 2022-01-03
Payer: COMMERCIAL

## 2022-01-03 VITALS
TEMPERATURE: 98.2 F | SYSTOLIC BLOOD PRESSURE: 127 MMHG | BODY MASS INDEX: 18.9 KG/M2 | WEIGHT: 135 LBS | OXYGEN SATURATION: 98 % | HEART RATE: 80 BPM | DIASTOLIC BLOOD PRESSURE: 64 MMHG | HEIGHT: 71 IN | RESPIRATION RATE: 18 BRPM

## 2022-01-03 DIAGNOSIS — H93.13 TINNITUS OF BOTH EARS: Primary | ICD-10-CM

## 2022-01-03 PROCEDURE — 99213 OFFICE O/P EST LOW 20 MIN: CPT | Performed by: EMERGENCY MEDICINE

## 2022-01-03 RX ORDER — PREDNISONE 10 MG/1
TABLET ORAL
Qty: 27 TABLET | Refills: 0 | Status: SHIPPED | OUTPATIENT
Start: 2022-01-03

## 2022-01-03 NOTE — PATIENT INSTRUCTIONS
Tinnitus   WHAT YOU NEED TO KNOW:   Tinnitus is when you hear ringing, clicking, buzzing, or hissing in one or both ears  You may also hear whistling, chirping, or pulsing  It may be soft or loud, and at a low or high pitch  Tinnitus that lasts for longer than 6 months is considered chronic  DISCHARGE INSTRUCTIONS:   Call 911 if:   · You feel like hurting yourself or others because of the constant noise  Contact your healthcare provider if:   · You have headaches  · You are tired and have trouble concentrating or remembering things  · You have more anxiety or stress than usual     · You have deep sadness or depression  · You have trouble falling asleep or staying asleep  · Your symptoms do not go away or they get worse  · You have questions or concerns about your condition or care  Manage tinnitus:   · Counseling  can help you learn ways to relax, decrease stress, and make your tinnitus less noticeable  · Cognitive behavioral therapy  helps you understand your condition  Your therapist will help you learn to cope with tinnitus  You may also learn new ways to relax and retrain your behavior to decrease your symptoms  · Sound therapy, such as white noise machines, may help cover your tinnitus with a pleasant sound  Sound therapy devices can help you fall asleep or help you relax  These devices can be worn in your ear or placed next to your bed at night  · Hearing aids or cochlear implants  may help if you have hearing loss  · Do not smoke  Nicotine decreases blood flow to your ear and can make your tinnitus worse  Do not use e-cigarettes or smokeless tobacco in place of cigarettes or to help you quit  They still contain nicotine  Ask your healthcare provider for information if you currently smoke and need help quitting  · Decrease how much alcohol and caffeine you drink  Alcohol and caffeine can make your tinnitus worse      Prevent tinnitus:   · Avoid exposure to loud noise, such as loud music or power tools  Occasional exposure can still cause tinnitus  Move away from the noise or turn down the volume  · Wear ear protection  when you are exposed to loud noises  Good ear protection includes ear plugs or headphones that reduce noise  Follow up with your healthcare provider in 1 to 2 months:  Your healthcare provider may refer you to an otolaryngologist, audiologist, or neurologist  Larry Lozada may need to return for regular follow-up visits  Write your questions down so you remember to ask them during your visits  © Copyright [employer] 2021 Information is for End User's use only and may not be sold, redistributed or otherwise used for commercial purposes  All illustrations and images included in CareNotes® are the copyrighted property of [employer]  or Ileana Mendoza  The above information is an  only  It is not intended as medical advice for individual conditions or treatments  Talk to your doctor, nurse or pharmacist before following any medical regimen to see if it is safe and effective for you

## 2022-01-03 NOTE — PROGRESS NOTES
[facility] [facility]        NAME: Sandra Fraire is a 24 y o  male  : 2000    MRN: [id number]  DATE: January 3, 2022  TIME: 6:46 PM    Assessment and Plan   Tinnitus of both ears [H93 13]  1  Tinnitus of both ears  Ambulatory Referral to Otolaryngology    predniSONE 10 mg tablet         Patient Instructions     Patient Instructions   Tinnitus   WHAT YOU NEED TO KNOW:   Tinnitus is when you hear ringing, clicking, buzzing, or hissing in one or both ears  You may also hear whistling, chirping, or pulsing  It may be soft or loud, and at a low or high pitch  Tinnitus that lasts for longer than 6 months is considered chronic  DISCHARGE INSTRUCTIONS:   Call 911 if:   · You feel like hurting yourself or others because of the constant noise  Contact your healthcare provider if:   · You have headaches  · You are tired and have trouble concentrating or remembering things  · You have more anxiety or stress than usual     · You have deep sadness or depression  · You have trouble falling asleep or staying asleep  · Your symptoms do not go away or they get worse  · You have questions or concerns about your condition or care  Manage tinnitus:   · Counseling  can help you learn ways to relax, decrease stress, and make your tinnitus less noticeable  · Cognitive behavioral therapy  helps you understand your condition  Your therapist will help you learn to cope with tinnitus  You may also learn new ways to relax and retrain your behavior to decrease your symptoms  · Sound therapy, such as white noise machines, may help cover your tinnitus with a pleasant sound  Sound therapy devices can help you fall asleep or help you relax  These devices can be worn in your ear or placed next to your bed at night  · Hearing aids or cochlear implants  may help if you have hearing loss  · Do not smoke  Nicotine decreases blood flow to your ear and can make your tinnitus worse   Do not use e-cigarettes or smokeless tobacco in place of cigarettes or to help you quit  They still contain nicotine  Ask your healthcare provider for information if you currently smoke and need help quitting  · Decrease how much alcohol and caffeine you drink  Alcohol and caffeine can make your tinnitus worse  Prevent tinnitus:   · Avoid exposure to loud noise, such as loud music or power tools  Occasional exposure can still cause tinnitus  Move away from the noise or turn down the volume  · Wear ear protection  when you are exposed to loud noises  Good ear protection includes ear plugs or headphones that reduce noise  Follow up with your healthcare provider in 1 to 2 months:  Your healthcare provider may refer you to an otolaryngologist, audiologist, or neurologist  Nikole Alcantara may need to return for regular follow-up visits  Write your questions down so you remember to ask them during your visits  © Copyright [employer] 2021 Information is for End User's use only and may not be sold, redistributed or otherwise used for commercial purposes  All illustrations and images included in CareNotes® are the copyrighted property of A D A M , Inc  or [facility]   The above information is an  only  It is not intended as medical advice for individual conditions or treatments  Talk to your doctor, nurse or pharmacist before following any medical regimen to see if it is safe and effective for you  Follow up with PCP in 3-5 days  Proceed to  ER if symptoms worsen  Chief Complaint     Chief Complaint   Patient presents with    Tinnitus     Started 3 weeks ago, ringing in the ears with sinus pressure and headaches that come and go          History of Present Illness       Patient complains of ringing in the ears for the past 3 weeks  Onset of symptoms came with sinus congestion which has since resolved  He denies any vertigo        Review of Systems   Review of Systems   Constitutional: Negative for chills and fever    HENT: Positive for congestion and tinnitus  Negative for ear discharge, hearing loss, rhinorrhea and sinus pressure  Respiratory: Negative for cough, chest tightness, shortness of breath and wheezing  Gastrointestinal: Negative for diarrhea and vomiting  Musculoskeletal: Negative for neck stiffness  Skin: Negative for pallor  Neurological: Negative for headaches  Current Medications       Current Outpatient Medications:     predniSONE 10 mg tablet, Take once daily all days pills on this schedule 6- 6- 5- 4- 3- 2- 1, Disp: 27 tablet, Rfl: 0    Current Allergies     Allergies as of 01/03/2022 - Reviewed 01/03/2022   Allergen Reaction Noted    Cefprozil Hives 03/19/2013    Penicillins Hives 11/09/2012            The following portions of the patient's history were reviewed and updated as appropriate: allergies, current medications, past family history, past medical history, past social history, past surgical history and problem list      Past Medical History:   Diagnosis Date    Allergic rhinitis     Resolved 2/27/2017     Displaced fracture of proximal phalanx of unspecified thumb, initial encounter for closed fracture     Resolved 2/27/2017     Head injury     Resolved 2/27/2017     Migraines        Past Surgical History:   Procedure Laterality Date    NO PAST SURGERIES         History reviewed  No pertinent family history  Medications have been verified  Objective   /64   Pulse 80   Temp 98 2 °F (36 8 °C)   Resp 18   Ht 5' 11" (1 803 m)   Wt 61 2 kg (135 lb)   SpO2 98%   BMI 18 83 kg/m²        Physical Exam     Physical Exam  Vitals and nursing note reviewed  Constitutional:       General: He is not in acute distress  Appearance: He is well-developed  He is not diaphoretic  HENT:      Head: Normocephalic and atraumatic        Right Ear: Tympanic membrane, ear canal and external ear normal       Left Ear: Tympanic membrane, ear canal and external ear normal       Nose: Mucosal edema and congestion present  No rhinorrhea  Mouth/Throat:      Pharynx: No posterior oropharyngeal erythema  Cardiovascular:      Rate and Rhythm: Normal rate and regular rhythm  Heart sounds: Normal heart sounds  Musculoskeletal:         General: Normal range of motion  Cervical back: Neck supple  Lymphadenopathy:      Cervical: No cervical adenopathy  Skin:     General: Skin is warm and dry  Coloration: Skin is not pale  Neurological:      Mental Status: He is alert and oriented to person, place, and time  Psychiatric:         Mood and Affect: Mood normal          Behavior: Behavior normal          Thought Content:  Thought content normal          Judgment: Judgment normal

## 2022-03-18 NOTE — PATIENT INSTRUCTIONS
Complete COVID testing and remain quarantine at home until resulted is known  Finish Medrol Dosepak   Return in 1 week if any symptoms remain   If symptoms worsen report to [facility] Emergency Department 18-Mar-2022 17:30

## 2023-11-08 ENCOUNTER — HOSPITAL ENCOUNTER (EMERGENCY)
Facility: HOSPITAL | Age: 23
Discharge: HOME | End: 2023-11-08
Attending: EMERGENCY MEDICINE | Admitting: EMERGENCY MEDICINE

## 2023-11-08 VITALS
HEART RATE: 96 BPM | DIASTOLIC BLOOD PRESSURE: 65 MMHG | RESPIRATION RATE: 16 BRPM | WEIGHT: 136.47 LBS | SYSTOLIC BLOOD PRESSURE: 139 MMHG | TEMPERATURE: 97.9 F | BODY MASS INDEX: 19.03 KG/M2 | OXYGEN SATURATION: 98 %

## 2023-11-08 DIAGNOSIS — S61.211A LACERATION OF LEFT INDEX FINGER WITHOUT FOREIGN BODY WITHOUT DAMAGE TO NAIL, INITIAL ENCOUNTER: Primary | ICD-10-CM

## 2023-11-08 PROCEDURE — 99282 EMERGENCY DEPT VISIT SF MDM: CPT

## 2023-11-08 PROCEDURE — 12001 RPR S/N/AX/GEN/TRNK 2.5CM/<: CPT

## 2023-11-08 PROCEDURE — 90715 TDAP VACCINE 7 YRS/> IM: CPT

## 2023-11-08 PROCEDURE — 99284 EMERGENCY DEPT VISIT MOD MDM: CPT

## 2023-11-08 PROCEDURE — 90471 IMMUNIZATION ADMIN: CPT

## 2023-11-08 RX ORDER — LIDOCAINE HYDROCHLORIDE 10 MG/ML
5 INJECTION, SOLUTION EPIDURAL; INFILTRATION; INTRACAUDAL; PERINEURAL ONCE
Status: COMPLETED | OUTPATIENT
Start: 2023-11-08 | End: 2023-11-08

## 2023-11-08 RX ORDER — CLINDAMYCIN HYDROCHLORIDE 300 MG/1
600 CAPSULE ORAL 4 TIMES DAILY
Qty: 40 CAPSULE | Refills: 0 | Status: SHIPPED | OUTPATIENT
Start: 2023-11-08 | End: 2023-11-13

## 2023-11-08 RX ORDER — CLINDAMYCIN HYDROCHLORIDE 150 MG/1
300 CAPSULE ORAL ONCE
Status: COMPLETED | OUTPATIENT
Start: 2023-11-08 | End: 2023-11-08

## 2023-11-08 RX ORDER — CLINDAMYCIN HYDROCHLORIDE 300 MG/1
600 CAPSULE ORAL 4 TIMES DAILY
Qty: 40 CAPSULE | Refills: 0 | Status: SHIPPED | OUTPATIENT
Start: 2023-11-08 | End: 2023-11-08 | Stop reason: SDUPTHER

## 2023-11-08 RX ADMIN — LIDOCAINE HYDROCHLORIDE 5 ML: 10 INJECTION, SOLUTION EPIDURAL; INFILTRATION; INTRACAUDAL at 13:57

## 2023-11-08 RX ADMIN — TETANUS TOXOID, REDUCED DIPHTHERIA TOXOID AND ACELLULAR PERTUSSIS VACCINE, ADSORBED 0.5 ML: 5; 2.5; 8; 8; 2.5 SUSPENSION INTRAMUSCULAR at 14:02

## 2023-11-08 RX ADMIN — CLINDAMYCIN HYDROCHLORIDE 300 MG: 150 CAPSULE ORAL at 14:49

## 2023-11-08 NOTE — DISCHARGE INSTRUCTIONS
Keep stitches clean and dry. Follow up with ER/urgent care/primary care to have them removed in 7 days. Monitor for signs of infection such as increasing redness, puss, swelling, increased pain and fever/chills. Return to ER if any of those symptoms occur. Take 600mg of clindamycin 4 times a day for the next five days. Can take a probiotic with the antibiotic to help with side effect of stomach upset.

## 2023-11-08 NOTE — ED PROVIDER NOTES
History  Chief Complaint   Patient presents with    Finger Laceration     Pt reports cutting left pointer finger with hedge cutter, bleeding controlled in triage, unsure of last tetanus     Cecilio Shah is a 21year old male presenting to the ER after sustaining a laceration to his left pointer finger at work while using a hedge cutter. He states the injury occurred a half hour before his presentation to the ER and denies having fallen, additional trauma or injuries or LOC. He does not know when his last tetanus vaccine was and has an allergy to cephalosporins and penicillins. He has full range of motion of the affected finger and sensation is fully in-tact. Patient has never had stiches before or injured this finger. Finger Laceration  Associated symptoms: no fever        Prior to Admission Medications   Prescriptions Last Dose Informant Patient Reported? Taking?   predniSONE 10 mg tablet   No No   Sig: Take once daily all days pills on this schedule 6- 6- 5- 4- 3- 2- 1      Facility-Administered Medications: None       Past Medical History:   Diagnosis Date    Allergic rhinitis     Resolved 2/27/2017     Displaced fracture of proximal phalanx of unspecified thumb, initial encounter for closed fracture     Resolved 2/27/2017     Head injury     Resolved 2/27/2017     Migraines        Past Surgical History:   Procedure Laterality Date    NO PAST SURGERIES         History reviewed. No pertinent family history. I have reviewed and agree with the history as documented. E-Cigarette/Vaping    E-Cigarette Use Current Some Day User      E-Cigarette/Vaping Substances    Nicotine Yes      Social History     Tobacco Use    Smoking status: Never    Smokeless tobacco: Never   Vaping Use    Vaping Use: Some days    Substances: Nicotine   Substance Use Topics    Alcohol use: Yes     Comment: occassionally        Review of Systems   Constitutional:  Negative for chills and fever.    Respiratory:  Negative for chest tightness and shortness of breath. Cardiovascular:  Negative for chest pain and palpitations. Skin:  Positive for wound. Laceration to the ventral portion of left index finger. Incision is jagged and bleeding. Neurological:  Negative for dizziness, tremors, weakness, light-headedness, numbness and headaches. Physical Exam  Physical Exam  Vitals reviewed. Constitutional:       General: He is not in acute distress. Appearance: Normal appearance. He is normal weight. He is not ill-appearing, toxic-appearing or diaphoretic. HENT:      Head: Normocephalic and atraumatic. Right Ear: External ear normal.      Left Ear: External ear normal.      Nose: Nose normal.      Mouth/Throat:      Mouth: Mucous membranes are moist.   Eyes:      Extraocular Movements: Extraocular movements intact. Conjunctiva/sclera: Conjunctivae normal.   Cardiovascular:      Rate and Rhythm: Normal rate and regular rhythm. Pulses: Normal pulses. Heart sounds: Normal heart sounds. No murmur heard. No friction rub. No gallop. Pulmonary:      Effort: Pulmonary effort is normal. No respiratory distress. Breath sounds: Normal breath sounds. No wheezing, rhonchi or rales. Abdominal:      General: Abdomen is flat. Musculoskeletal:         General: Signs of injury present. Normal range of motion. Right hand: Normal.      Left hand: Laceration and tenderness present. No swelling or bony tenderness. Normal sensation. Normal capillary refill. Normal pulse. Hands:       Cervical back: Normal range of motion. Comments: 2cm jagged laceration on ventral portion of L index finger. Incision on the distal portion of the finger. Bleeding slightly. Skin:     General: Skin is warm and dry. Capillary Refill: Capillary refill takes less than 2 seconds. Neurological:      General: No focal deficit present. Mental Status: He is alert and oriented to person, place, and time.  Mental status is at baseline. Sensory: No sensory deficit. Motor: No weakness. Psychiatric:         Mood and Affect: Mood normal.         Behavior: Behavior normal.         Vital Signs  ED Triage Vitals [11/08/23 1310]   Temperature Pulse Respirations Blood Pressure SpO2   97.9 °F (36.6 °C) 96 16 139/65 98 %      Temp Source Heart Rate Source Patient Position - Orthostatic VS BP Location FiO2 (%)   Oral Monitor Sitting Right arm --      Pain Score       5           Vitals:    11/08/23 1310   BP: 139/65   Pulse: 96   Patient Position - Orthostatic VS: Sitting         Visual Acuity      ED Medications  Medications   lidocaine (PF) (XYLOCAINE-MPF) 1 % injection 5 mL (5 mL Infiltration Given 11/8/23 1357)   tetanus-diphtheria-acellular pertussis (BOOSTRIX) IM injection 0.5 mL (0.5 mL Intramuscular Given 11/8/23 1402)   clindamycin (CLEOCIN) capsule 300 mg (300 mg Oral Given 11/8/23 1449)       Diagnostic Studies  Results Reviewed       None                   No orders to display              Procedures  Universal Protocol:  Consent: Verbal consent obtained. Risks and benefits: risks, benefits and alternatives were discussed  Consent given by: patient  Patient understanding: patient states understanding of the procedure being performed  Patient consent: the patient's understanding of the procedure matches consent given  Procedure consent: procedure consent matches procedure scheduled  Relevant documents: relevant documents present and verified  Test results: test results available and properly labeled  Site marked: the operative site was marked  Radiology Images displayed and confirmed.  If images not available, report reviewed: imaging studies available  Required items: required blood products, implants, devices, and special equipment available  Patient identity confirmed: verbally with patient  Laceration repair    Date/Time: 11/8/2023 1:50 PM    Performed by: Gabby Begum PA-C  Authorized by: Gabby Begum LEO  Body area: upper extremity  Location details: left index finger  Laceration length: 2 cm  Foreign bodies: no foreign bodies  Tendon involvement: none  Nerve involvement: none  Vascular damage: no  Anesthesia: nerve block and see MAR for details    Anesthesia:  Anesthetic total: 2.5 mL    Sedation:  Patient sedated: no        Procedure Details:  Preparation: Patient was prepped and draped in the usual sterile fashion. Irrigation solution: saline  Irrigation method: syringe  Amount of cleaning: standard  Debridement: none  Degree of undermining: none  Skin closure: 5-0 nylon  Number of sutures: 7  Technique: simple  Approximation: close  Approximation difficulty: simple  Dressing: gauze roll  Patient tolerance: patient tolerated the procedure well with no immediate complications  Comments: Gave prophylactic antibiotics  Cleaning details: dirt             ED Course                                             Medical Decision Making  Straightforward laceration repair, cleaned laceration prior to closure, gave prophylactic antibiotics, close follow up. Risk  Prescription drug management. Disposition  Final diagnoses:   Laceration of left index finger without foreign body without damage to nail, initial encounter     Time reflects when diagnosis was documented in both MDM as applicable and the Disposition within this note       Time User Action Codes Description Comment    11/8/2023  2:47 PM Shira Marte Add [U17.909E] Laceration of left index finger without foreign body without damage to nail, initial encounter           ED Disposition       ED Disposition   Discharge    Condition   Stable    Date/Time   Wed Nov 8, 2023  2:52 PM    Comment   Pro Winter discharge to home/self care.                    Follow-up Information       Follow up With Specialties Details Why Contact Info    Noemi Johnson PA-C Family Medicine, Physician Assistant   [address] [facility],First Floor  [address] [facility] [address]  [phone]              Discharge Medication List as of 11/8/2023  2:53 PM        START taking these medications    Details   clindamycin (CLEOCIN) 300 MG capsule Take 2 capsules (600 mg total) by mouth 4 (four) times a day for 5 days, Starting Wed 11/8/2023, Until Mon 11/13/2023, Print           CONTINUE these medications which have NOT CHANGED    Details   predniSONE 10 mg tablet Take once daily all days pills on this schedule 6- 6- 5- 4- 3- 2- 1, Normal             No discharge procedures on file.     PDMP Review       None            ED Provider  Electronically Signed by             Ever Ceballos PA-C  11/08/23 7594